# Patient Record
Sex: MALE | Race: WHITE | NOT HISPANIC OR LATINO | Employment: UNEMPLOYED | URBAN - METROPOLITAN AREA
[De-identification: names, ages, dates, MRNs, and addresses within clinical notes are randomized per-mention and may not be internally consistent; named-entity substitution may affect disease eponyms.]

---

## 2020-04-28 ENCOUNTER — TELEPHONE (OUTPATIENT)
Dept: CARDIOLOGY | Facility: CLINIC | Age: 65
End: 2020-04-28

## 2020-04-28 NOTE — TELEPHONE ENCOUNTER
Dr. Rivas Judd   [facility]  Phone [phone]  Fax [phone]  Removal of right cervical lymph node 5/13/2020  Patient has apt 4/29/2020 for clearance

## 2020-04-29 ENCOUNTER — TELEMEDICINE (OUTPATIENT)
Dept: CARDIOLOGY | Facility: CLINIC | Age: 65
End: 2020-04-29

## 2020-04-29 VITALS — WEIGHT: 315 LBS | BODY MASS INDEX: 40.43 KG/M2 | HEIGHT: 74 IN

## 2020-04-29 DIAGNOSIS — I25.10 CORONARY ARTERY DISEASE INVOLVING NATIVE CORONARY ARTERY OF NATIVE HEART WITHOUT ANGINA PECTORIS: Primary | ICD-10-CM

## 2020-04-29 DIAGNOSIS — I10 ESSENTIAL HYPERTENSION: ICD-10-CM

## 2020-04-29 DIAGNOSIS — E78.00 PURE HYPERCHOLESTEROLEMIA: ICD-10-CM

## 2020-04-29 PROCEDURE — 99213 OFFICE O/P EST LOW 20 MIN: CPT | Performed by: INTERNAL MEDICINE

## 2020-04-29 RX ORDER — LORATADINE 10 MG/1
10 TABLET ORAL DAILY
COMMUNITY
Start: 2020-04-10

## 2020-04-29 RX ORDER — CLOTRIMAZOLE AND BETAMETHASONE DIPROPIONATE 10; .64 MG/G; MG/G
CREAM TOPICAL
COMMUNITY
Start: 2019-11-19

## 2020-04-29 RX ORDER — AZELASTINE HYDROCHLORIDE 0.5 MG/ML
SOLUTION/ DROPS OPHTHALMIC
COMMUNITY
Start: 2020-04-28

## 2020-04-29 RX ORDER — BLACK COHOSH ROOT EXTRACT 80 MG
1 CAPSULE ORAL 2 TIMES DAILY
COMMUNITY
Start: 2020-02-06

## 2020-04-29 RX ORDER — NYSTATIN 100000 U/G
OINTMENT TOPICAL
COMMUNITY
Start: 2020-01-07

## 2020-04-29 RX ORDER — MONTELUKAST SODIUM 10 MG/1
TABLET ORAL
COMMUNITY
Start: 2020-01-02

## 2020-04-29 RX ORDER — SILDENAFIL CITRATE 20 MG/1
TABLET ORAL
COMMUNITY
Start: 2020-04-11 | End: 2021-04-19

## 2020-04-29 RX ORDER — ALLOPURINOL 300 MG/1
300 TABLET ORAL DAILY
COMMUNITY
Start: 2020-04-07

## 2020-04-29 NOTE — PROGRESS NOTES
"You have chosen to receive care through a telehealth visit.  Do you consent to use a video/audio connection for your medical care today? Yes.  Total time spent 15 minutes  Subjective:     Encounter Date:04/29/2020      Patient ID: Armando Evans is a 64 y.o. male.    Chief Complaint:  History of Present Illness 64-year-old white male with history of coronary disease hypertension hyperlipidemia COPD morbid obesity and sleep apnea presented to my office for a video visit.  Patient is currently stable without any symptoms of chest pain or shortness of breath at rest on exertion.  No complains any PND orthopnea.  No palpitation dizziness syncope or swelling of the feet.  Patient has been taking all the medicines regularly.  Patient does not smoke.  Patient uses CPAP machine regularly and follows a good diet but does not exercise very well  Ht 188 cm (74\")   Wt (!) 159 kg (350 lb)   BMI 44.94 kg/m²     The following portions of the patient's history were reviewed and updated as appropriate: allergies, current medications, past family history, past medical history, past social history, past surgical history and problem list.  Past Medical History:   Diagnosis Date   • Asthma    • COPD (chronic obstructive pulmonary disease) (CMS/[facility])    • Coronary artery disease    • Hyperlipidemia    • Hypertension    • Sleep apnea      Past Surgical History:   Procedure Laterality Date   • CORONARY ARTERY BYPASS GRAFT      1 vessel in 2005     Social History     Socioeconomic History   • Marital status: Single     Spouse name: Not on file   • Number of children: Not on file   • Years of education: Not on file   • Highest education level: Not on file   Tobacco Use   • Smoking status: Former Smoker     Last attempt to quit: 2005     Years since quitting: 15.3   • Smokeless tobacco: Never Used   Substance and Sexual Activity   • Alcohol use: Never     Frequency: Never   • Drug use: Never   • Sexual activity: Defer     Family History "   Problem Relation Age of Onset   • Hypertension Father        Current Outpatient Medications:   •  albuterol sulfate HFA (PROAIR HFA) 108 (90 Base) MCG/ACT inhaler, PROAIR  (90 Base) MCG/ACT AERS, Disp: , Rfl:   •  allopurinol (ZYLOPRIM) 300 MG tablet, Take 300 mg by mouth Daily., Disp: , Rfl:   •  ALPRAZolam (XANAX) 1 MG tablet, ALPRAZOLAM 1 MG TABS, Disp: , Rfl:   •  amLODIPine (NORVASC) 5 MG tablet, Daily., Disp: , Rfl:   •  atorvastatin (LIPITOR) 20 MG tablet, LIPITOR 20 MG TABS, Disp: , Rfl:   •  azelastine (OPTIVAR) 0.05 % ophthalmic solution, , Disp: , Rfl:   •  clotrimazole-betamethasone (LOTRISONE) 1-0.05 % cream, APPLY CREAM TO UNDERARM RASH TWICE DAILY, Disp: , Rfl:   •  escitalopram (LEXAPRO) 10 MG tablet, LEXAPRO 10 MG TABS, Disp: , Rfl:   •  fluticasone-salmeterol (ADVAIR DISKUS) 500-50 MCG/DOSE DISKUS, ADVAIR DISKUS 500-50 MCG/DOSE AEPB, Disp: , Rfl:   •  furosemide (LASIX) 40 MG tablet, Daily., Disp: , Rfl:   •  HYDROcodone-acetaminophen (NORCO) 5-325 MG per tablet, HYDROCODONE-ACETAMINOPHEN 5-325 MG TABS, Disp: , Rfl:   •  Lactobacillus (ACIDOPHILUS PROBIOTIC) 100 MG capsule, Take 1 capsule by mouth 2 (Two) Times a Day., Disp: , Rfl:   •  loratadine (CLARITIN) 10 MG tablet, Take 10 mg by mouth Daily., Disp: , Rfl:   •  losartan (COZAAR) 50 MG tablet, LOSARTAN POTASSIUM 50 MG TABS, Disp: , Rfl:   •  meclizine (ANTIVERT) 12.5 MG tablet, MECLIZINE HCL 12.5 MG TABS, Disp: , Rfl:   •  metoprolol tartrate (LOPRESSOR) 50 MG tablet, METOPROLOL TARTRATE 50 MG TABS, Disp: , Rfl:   •  montelukast (SINGULAIR) 10 MG tablet, , Disp: , Rfl:   •  pantoprazole (PROTONIX) 40 MG EC tablet, PROTONIX 40 MG TBEC, Disp: , Rfl:   •  Polyethylene Glycol 3350 (MIRALAX PO), MIRALAX PACK, Disp: , Rfl:   •  potassium chloride (K-DUR) 10 MEQ CR tablet, POTASSIUM CHLORIDE ER 10 MEQ CR-TABS, Disp: , Rfl:   •  risperiDONE (risperDAL) 1 MG tablet, RISPERIDONE 1 MG TABS, Disp: , Rfl:   •  sildenafil (REVATIO) 20 MG tablet,  TAKE 1-5 TABLETS BY MOUTH AS NEEDED FOR SEXUAL ACTIVITY, Disp: , Rfl:   •  theophylline (THEODUR) 450 MG 12 hr tablet, THEOPHYLLINE  MG XR12H-TAB, Disp: , Rfl:   •  tiotropium (SPIRIVA HANDIHALER) 18 MCG per inhalation capsule, SPIRIVA HANDIHALER 18 MCG CAPS, Disp: , Rfl:   •  Umeclidinium Bromide (Incruse Ellipta) 62.5 MCG/INH aerosol powder , , Disp: , Rfl:   •  allopurinol (ZYLOPRIM) 100 MG tablet, Daily., Disp: , Rfl:   •  nystatin (MYCOSTATIN) 149818 UNIT/GM ointment, , Disp: , Rfl:   •  zafirlukast (ACCOLATE) 20 MG tablet, ACCOLATE 20 MG TABS, Disp: , Rfl:   Allergies   Allergen Reactions   • Oxycodone Hallucinations       Review of Systems   Constitution: Negative for fever and malaise/fatigue.   HENT: Negative for congestion and hearing loss.    Eyes: Negative for double vision and visual disturbance.   Cardiovascular: Negative for chest pain, claudication, dyspnea on exertion, leg swelling and syncope.   Respiratory: Negative for cough and shortness of breath.    Endocrine: Negative for cold intolerance.   Skin: Negative for color change and rash.   Musculoskeletal: Negative for arthritis and joint pain.   Gastrointestinal: Negative for abdominal pain and heartburn.   Genitourinary: Negative for hematuria.   Neurological: Negative for excessive daytime sleepiness and dizziness.   Psychiatric/Behavioral: Negative for depression. The patient is not nervous/anxious.    All other systems reviewed and are negative.             Objective:     Physical Exam    Procedures    Lab Review:       Assessment:          Diagnosis Plan   1. Coronary artery disease involving native coronary artery of native heart without angina pectoris     2. Pure hypercholesterolemia     3. Essential hypertension            Plan:       Patient has history of coronary disease and is currently stable on medical therapy  Patient's blood pressure and heart rate stable  Patient's lipid levels are followed by the primary care  doctor  Patient has sleep apnea uses a CPAP machine.  Continue current medicines and follow in 3 months

## 2020-07-30 ENCOUNTER — OFFICE VISIT (OUTPATIENT)
Dept: CARDIOLOGY | Facility: CLINIC | Age: 65
End: 2020-07-30

## 2020-07-30 VITALS
HEART RATE: 62 BPM | BODY MASS INDEX: 40.43 KG/M2 | WEIGHT: 315 LBS | SYSTOLIC BLOOD PRESSURE: 112 MMHG | HEIGHT: 74 IN | DIASTOLIC BLOOD PRESSURE: 75 MMHG | OXYGEN SATURATION: 96 %

## 2020-07-30 DIAGNOSIS — I25.10 CORONARY ARTERY DISEASE INVOLVING NATIVE CORONARY ARTERY OF NATIVE HEART WITHOUT ANGINA PECTORIS: Primary | ICD-10-CM

## 2020-07-30 DIAGNOSIS — I10 ESSENTIAL HYPERTENSION: ICD-10-CM

## 2020-07-30 DIAGNOSIS — E78.00 PURE HYPERCHOLESTEROLEMIA: ICD-10-CM

## 2020-07-30 DIAGNOSIS — G47.33 OBSTRUCTIVE SLEEP APNEA: ICD-10-CM

## 2020-07-30 PROCEDURE — 99213 OFFICE O/P EST LOW 20 MIN: CPT | Performed by: INTERNAL MEDICINE

## 2020-07-30 NOTE — PROGRESS NOTES
"    Subjective:     Encounter Date:07/30/2020      Patient ID: Armando Evans is a 64 y.o. male.    Chief Complaint:  History of Present Illness 64-year-old white male with history of coronary disease history of hypertension hyperlipidemia and obstructive sleep apnea presents to my office for follow-up.  Patient is currently stable without any symptoms of chest pain or shortness of breath at rest but has some shortness of breath with exertion.  No complains any PND orthopnea but is been having occasional palpitation with dizziness.  No syncope or swelling of the feet.  He is taking his medicines regularly.  He is also using CPAP machine regularly.  He follows a good diet.  And is trying to exercise regular.    The following portions of the patient's history were reviewed and updated as appropriate: allergies, current medications, past family history, past medical history, past social history, past surgical history and problem list.  Past Medical History:   Diagnosis Date   • Asthma    • COPD (chronic obstructive pulmonary disease) (CMS/[facility])    • Coronary artery disease    • Hyperlipidemia    • Hypertension    • Lymphoma (CMS/[facility])    • Sleep apnea      Past Surgical History:   Procedure Laterality Date   • CORONARY ARTERY BYPASS GRAFT      1 vessel in 2005     /75   Pulse 62   Ht 188 cm (74\")   Wt (!) 146 kg (322 lb)   SpO2 96%   BMI 41.34 kg/m²   Family History   Problem Relation Age of Onset   • Hypertension Father    • Heart disease Father        Current Outpatient Medications:   •  albuterol sulfate HFA (PROAIR HFA) 108 (90 Base) MCG/ACT inhaler, PROAIR  (90 Base) MCG/ACT AERS, Disp: , Rfl:   •  allopurinol (ZYLOPRIM) 300 MG tablet, Take 300 mg by mouth Daily., Disp: , Rfl:   •  ALPRAZolam (XANAX) 1 MG tablet, ALPRAZOLAM 1 MG TABS, Disp: , Rfl:   •  amLODIPine (NORVASC) 5 MG tablet, Daily., Disp: , Rfl:   •  atorvastatin (LIPITOR) 20 MG tablet, LIPITOR 20 MG TABS, Disp: , Rfl:   •  azelastine " (OPTIVAR) 0.05 % ophthalmic solution, , Disp: , Rfl:   •  clotrimazole-betamethasone (LOTRISONE) 1-0.05 % cream, APPLY CREAM TO UNDERARM RASH TWICE DAILY, Disp: , Rfl:   •  escitalopram (LEXAPRO) 10 MG tablet, LEXAPRO 10 MG TABS, Disp: , Rfl:   •  fluticasone-salmeterol (ADVAIR DISKUS) 500-50 MCG/DOSE DISKUS, ADVAIR DISKUS 500-50 MCG/DOSE AEPB, Disp: , Rfl:   •  furosemide (LASIX) 40 MG tablet, Daily., Disp: , Rfl:   •  HYDROcodone-acetaminophen (NORCO) 5-325 MG per tablet, HYDROCODONE-ACETAMINOPHEN 5-325 MG TABS, Disp: , Rfl:   •  Lactobacillus (ACIDOPHILUS PROBIOTIC) 100 MG capsule, Take 1 capsule by mouth 2 (Two) Times a Day., Disp: , Rfl:   •  loratadine (CLARITIN) 10 MG tablet, Take 10 mg by mouth Daily., Disp: , Rfl:   •  losartan (COZAAR) 50 MG tablet, LOSARTAN POTASSIUM 50 MG TABS, Disp: , Rfl:   •  meclizine (ANTIVERT) 12.5 MG tablet, MECLIZINE HCL 12.5 MG TABS, Disp: , Rfl:   •  metoprolol tartrate (LOPRESSOR) 50 MG tablet, METOPROLOL TARTRATE 50 MG TABS, Disp: , Rfl:   •  montelukast (SINGULAIR) 10 MG tablet, , Disp: , Rfl:   •  nystatin (MYCOSTATIN) 804507 UNIT/GM ointment, , Disp: , Rfl:   •  pantoprazole (PROTONIX) 40 MG EC tablet, PROTONIX 40 MG TBEC, Disp: , Rfl:   •  Polyethylene Glycol 3350 (MIRALAX PO), MIRALAX PACK, Disp: , Rfl:   •  potassium chloride (K-DUR) 10 MEQ CR tablet, POTASSIUM CHLORIDE ER 10 MEQ CR-TABS, Disp: , Rfl:   •  risperiDONE (risperDAL) 1 MG tablet, RISPERIDONE 1 MG TABS, Disp: , Rfl:   •  sildenafil (REVATIO) 20 MG tablet, TAKE 1-5 TABLETS BY MOUTH AS NEEDED FOR SEXUAL ACTIVITY, Disp: , Rfl:   •  theophylline (THEODUR) 450 MG 12 hr tablet, THEOPHYLLINE  MG XR12H-TAB, Disp: , Rfl:   •  tiotropium (SPIRIVA HANDIHALER) 18 MCG per inhalation capsule, SPIRIVA HANDIHALER 18 MCG CAPS, Disp: , Rfl:   •  Umeclidinium Bromide (Incruse Ellipta) 62.5 MCG/INH aerosol powder , , Disp: , Rfl:   •  zafirlukast (ACCOLATE) 20 MG tablet, ACCOLATE 20 MG TABS, Disp: , Rfl:   Allergies    Allergen Reactions   • Oxycodone Hallucinations     Social History     Socioeconomic History   • Marital status: Single     Spouse name: Not on file   • Number of children: Not on file   • Years of education: Not on file   • Highest education level: Not on file   Tobacco Use   • Smoking status: Former Smoker     Last attempt to quit: 2005     Years since quitting: 15.5   • Smokeless tobacco: Never Used   Substance and Sexual Activity   • Alcohol use: Never     Frequency: Never   • Drug use: Never   • Sexual activity: Defer     Review of Systems   Constitution: Negative for fever and malaise/fatigue.   Cardiovascular: Positive for palpitations. Negative for chest pain, dyspnea on exertion and leg swelling.   Respiratory: Positive for shortness of breath. Negative for cough.    Skin: Negative for rash.   Gastrointestinal: Negative for abdominal pain, nausea and vomiting.   Neurological: Positive for light-headedness. Negative for focal weakness, headaches and numbness.   All other systems reviewed and are negative.             Objective:     Physical Exam   Constitutional: He appears well-developed and well-nourished.   HENT:   Head: Normocephalic and atraumatic.   Eyes: Conjunctivae are normal. No scleral icterus.   Neck: Normal range of motion. Neck supple. No JVD present. Carotid bruit is not present.   Cardiovascular: Normal rate, regular rhythm, S1 normal, S2 normal, normal heart sounds and intact distal pulses. PMI is not displaced.   Pulmonary/Chest: Effort normal and breath sounds normal. He has no wheezes. He has no rales.   Abdominal: Soft. Bowel sounds are normal.   Neurological: He is alert. He has normal strength.   Skin: Skin is warm and dry. No rash noted.     Procedures    Lab Review:       Assessment:          Diagnosis Plan   1. Coronary artery disease involving native coronary artery of native heart without angina pectoris     2. Essential hypertension     3. Pure hypercholesterolemia     4.  Obstructive sleep apnea            Plan:     Patient has history of coronary disease and is currently stable on medical therapy  Patient has normal function  Patient blood pressure and heart rate stable  Patient lipid levels are followed by the primary care doctor  Patient is sleep apnea and uses a CPAP machine.

## 2021-04-19 ENCOUNTER — OFFICE VISIT (OUTPATIENT)
Dept: CARDIOLOGY | Facility: CLINIC | Age: 66
End: 2021-04-19

## 2021-04-19 VITALS
BODY MASS INDEX: 40.43 KG/M2 | HEART RATE: 91 BPM | SYSTOLIC BLOOD PRESSURE: 125 MMHG | WEIGHT: 315 LBS | HEIGHT: 74 IN | DIASTOLIC BLOOD PRESSURE: 77 MMHG | TEMPERATURE: 97.3 F | OXYGEN SATURATION: 98 %

## 2021-04-19 DIAGNOSIS — I25.10 CORONARY ARTERY DISEASE INVOLVING NATIVE CORONARY ARTERY OF NATIVE HEART WITHOUT ANGINA PECTORIS: Primary | ICD-10-CM

## 2021-04-19 DIAGNOSIS — G47.33 OBSTRUCTIVE SLEEP APNEA: ICD-10-CM

## 2021-04-19 DIAGNOSIS — I10 ESSENTIAL HYPERTENSION: ICD-10-CM

## 2021-04-19 DIAGNOSIS — E78.00 PURE HYPERCHOLESTEROLEMIA: ICD-10-CM

## 2021-04-19 PROCEDURE — 99214 OFFICE O/P EST MOD 30 MIN: CPT | Performed by: INTERNAL MEDICINE

## 2021-04-19 RX ORDER — LOSARTAN POTASSIUM 100 MG/1
TABLET ORAL
COMMUNITY
Start: 2021-02-12

## 2021-04-19 RX ORDER — VENETOCLAX 100 MG/1
TABLET, FILM COATED ORAL
COMMUNITY
Start: 2021-03-31

## 2021-04-19 NOTE — PROGRESS NOTES
"    Subjective:     Encounter Date:04/19/2021      Patient ID: Armando Evans is a 65 y.o. male.    Chief Complaint:  History of Present Illness 65-year-old white male with history of coronary disease history of hypertension COPD hyperlipidemia sleep apnea presents to my office for follow-up.  Patient is currently stable without any symptoms of chest pain or shortness of breath at rest on exertion.  No complaints any PND orthopnea.  No palpitation dizziness syncope or swelling of the feet.  Patient has been taking all the medicines regularly.  Patient does not smoke but is trying to exercise regularly follows a good diet.  He also uses CPAP machine for    The following portions of the patient's history were reviewed and updated as appropriate: allergies, current medications, past family history, past medical history, past social history, past surgical history and problem list.  Past Medical History:   Diagnosis Date   • Asthma    • COPD (chronic obstructive pulmonary disease) (CMS/[facility])    • Coronary artery disease    • Hyperlipidemia    • Hypertension    • Lymphoma (CMS/[facility])    • Sleep apnea      Past Surgical History:   Procedure Laterality Date   • CORONARY ARTERY BYPASS GRAFT      1 vessel in 2005     /77 (BP Location: Left arm, Patient Position: Sitting, Cuff Size: Large Adult)   Pulse 91   Temp 97.3 °F (36.3 °C) (Infrared)   Ht 188 cm (74\")   Wt (!) 150 kg (330 lb)   SpO2 98%   BMI 42.37 kg/m²   Family History   Problem Relation Age of Onset   • Hypertension Father    • Heart disease Father        Current Outpatient Medications:   •  albuterol sulfate HFA (PROAIR HFA) 108 (90 Base) MCG/ACT inhaler, PROAIR  (90 Base) MCG/ACT AERS, Disp: , Rfl:   •  allopurinol (ZYLOPRIM) 300 MG tablet, Take 300 mg by mouth Daily., Disp: , Rfl:   •  ALPRAZolam (XANAX) 1 MG tablet, ALPRAZOLAM 1 MG TABS, Disp: , Rfl:   •  amLODIPine (NORVASC) 5 MG tablet, Daily., Disp: , Rfl:   •  atorvastatin (LIPITOR) 20 MG " tablet, LIPITOR 20 MG TABS, Disp: , Rfl:   •  azelastine (OPTIVAR) 0.05 % ophthalmic solution, , Disp: , Rfl:   •  clotrimazole-betamethasone (LOTRISONE) 1-0.05 % cream, APPLY CREAM TO UNDERARM RASH TWICE DAILY, Disp: , Rfl:   •  escitalopram (LEXAPRO) 10 MG tablet, LEXAPRO 10 MG TABS, Disp: , Rfl:   •  fluticasone-salmeterol (ADVAIR DISKUS) 500-50 MCG/DOSE DISKUS, ADVAIR DISKUS 500-50 MCG/DOSE AEPB, Disp: , Rfl:   •  HYDROcodone-acetaminophen (NORCO) 5-325 MG per tablet, HYDROCODONE-ACETAMINOPHEN 5-325 MG TABS, Disp: , Rfl:   •  loratadine (CLARITIN) 10 MG tablet, Take 10 mg by mouth Daily., Disp: , Rfl:   •  losartan (COZAAR) 100 MG tablet, TAKE 1/2 TABLET BY MOUTH DAILY FOR BLOOD PRESSURE, Disp: , Rfl:   •  meclizine (ANTIVERT) 12.5 MG tablet, MECLIZINE HCL 12.5 MG TABS, Disp: , Rfl:   •  metoprolol tartrate (LOPRESSOR) 50 MG tablet, METOPROLOL TARTRATE 50 MG TABS, Disp: , Rfl:   •  montelukast (SINGULAIR) 10 MG tablet, , Disp: , Rfl:   •  nystatin (MYCOSTATIN) [id number] UNIT/GM ointment, , Disp: , Rfl:   •  pantoprazole (PROTONIX) 40 MG EC tablet, PROTONIX 40 MG TBEC, Disp: , Rfl:   •  Polyethylene Glycol 3350 (MIRALAX PO), MIRALAX PACK, Disp: , Rfl:   •  potassium chloride (K-DUR) 10 MEQ CR tablet, POTASSIUM CHLORIDE ER 10 MEQ CR-TABS, Disp: , Rfl:   •  risperiDONE (risperDAL) 1 MG tablet, RISPERIDONE 1 MG TABS, Disp: , Rfl:   •  theophylline (THEODUR) 450 MG 12 hr tablet, THEOPHYLLINE  MG XR12H-TAB, Disp: , Rfl:   •  tiotropium (SPIRIVA HANDIHALER) 18 MCG per inhalation capsule, SPIRIVA HANDIHALER 18 MCG CAPS, Disp: , Rfl:   •  Umeclidinium Bromide (Incruse Ellipta) 62.5 MCG/INH aerosol powder , , Disp: , Rfl:   •  Venclexta 100 MG tablet, , Disp: , Rfl:   •  zafirlukast (ACCOLATE) 20 MG tablet, ACCOLATE 20 MG TABS, Disp: , Rfl:   •  furosemide (LASIX) 40 MG tablet, Daily., Disp: , Rfl:   •  Lactobacillus (ACIDOPHILUS PROBIOTIC) 100 MG capsule, Take 1 capsule by mouth 2 (Two) Times a Day., Disp: , Rfl:    Allergies   Allergen Reactions   • Oxycodone Hallucinations     Social History     Socioeconomic History   • Marital status: Single     Spouse name: Not on file   • Number of children: Not on file   • Years of education: Not on file   • Highest education level: Not on file   Tobacco Use   • Smoking status: Former Smoker     Quit date:      Years since quittin.3   • Smokeless tobacco: Never Used   Vaping Use   • Vaping Use: Never used   Substance and Sexual Activity   • Alcohol use: Never   • Drug use: Never   • Sexual activity: Defer     Review of Systems   Constitutional: Negative for fever and malaise/fatigue.   HENT: Negative for congestion and hearing loss.    Eyes: Negative for double vision and visual disturbance.   Cardiovascular: Negative for chest pain, claudication, dyspnea on exertion, leg swelling and syncope.   Respiratory: Negative for cough and shortness of breath.    Endocrine: Negative for cold intolerance.   Skin: Negative for color change and rash.   Musculoskeletal: Negative for arthritis and joint pain.   Gastrointestinal: Negative for abdominal pain and heartburn.   Genitourinary: Negative for hematuria.   Neurological: Negative for excessive daytime sleepiness and dizziness.   Psychiatric/Behavioral: Negative for depression. The patient is not nervous/anxious.    All other systems reviewed and are negative.             Objective:     Constitutional:       Appearance: Well-developed.   Eyes:      General: No scleral icterus.     Conjunctiva/sclera: Conjunctivae normal.   HENT:      Head: Normocephalic and atraumatic.   Neck:      Vascular: No carotid bruit or JVD.   Pulmonary:      Effort: Pulmonary effort is normal.      Breath sounds: Normal breath sounds. No wheezing. No rales.   Cardiovascular:      Normal rate. Regular rhythm.   Pulses:     Intact distal pulses.   Abdominal:      General: Bowel sounds are normal.      Palpations: Abdomen is soft.   Musculoskeletal:      Cervical  back: Normal range of motion and neck supple. Skin:     General: Skin is warm and dry.      Findings: No rash.   Neurological:      Mental Status: Alert.       Procedures    Lab Review:         MDM  1.  Coronary disease  Patient has nonobstructive coronary disease with normal B function is currently stable on medications  2.  Hypertension  Patient blood pressure currently stable on medications  3.  Hyperlipidemia  Patient's lipid levels are followed by primary doctor and is on a statin  4.  Obstructive sleep apnea  Patient has sleep apnea and uses a CPAP machine.

## 2024-12-16 ENCOUNTER — TELEPHONE (OUTPATIENT)
Dept: CARDIOLOGY | Facility: CLINIC | Age: 69
End: 2024-12-16
Payer: MEDICARE

## 2024-12-16 NOTE — TELEPHONE ENCOUNTER
Caller: [facility]    Relationship to patient:     Best call back number: [phone]    Patient is needing: [facility] WITH [facility] IS NEEDING THE MOST RECENT CARDIAC RECORDS FOR THIS PT FAXED TO THEM. PT MOVED CLOSER TO THEM AND IS SEEING A NEW CARDIOLOGIST.     FAX NUMBER IS  [phone] GIO MAYO